# Patient Record
Sex: MALE | Race: WHITE | Employment: FULL TIME | URBAN - METROPOLITAN AREA
[De-identification: names, ages, dates, MRNs, and addresses within clinical notes are randomized per-mention and may not be internally consistent; named-entity substitution may affect disease eponyms.]

---

## 2017-06-10 ENCOUNTER — HOSPITAL ENCOUNTER (EMERGENCY)
Facility: CLINIC | Age: 52
Discharge: HOME | End: 2017-06-10
Attending: EMERGENCY MEDICINE | Admitting: EMERGENCY MEDICINE
Payer: COMMERCIAL

## 2017-06-10 VITALS
SYSTOLIC BLOOD PRESSURE: 154 MMHG | DIASTOLIC BLOOD PRESSURE: 87 MMHG | BODY MASS INDEX: 25.77 KG/M2 | OXYGEN SATURATION: 98 % | HEIGHT: 70 IN | HEART RATE: 74 BPM | WEIGHT: 180 LBS | TEMPERATURE: 98.2 F | RESPIRATION RATE: 16 BRPM

## 2017-06-10 DIAGNOSIS — S01.01XA LACERATION OF SCALP, INITIAL ENCOUNTER: ICD-10-CM

## 2017-06-10 PROCEDURE — 99282 EMERGENCY DEPT VISIT SF MDM: CPT

## 2017-06-10 NOTE — ED AVS SNAPSHOT
Emergency Department    [address] [place] [address]    Phone:  [phone]    Fax:  [phone]                                       Darius Julian   MRN: [id number]    Department:   Emergency Department   Date of Visit:  6/10/2017           After Visit Summary Signature Page     I have received my discharge instructions, and my questions have been answered. I have discussed any challenges I see with this plan with the nurse or doctor.    ..........................................................................................................................................  Patient/Patient Representative Signature      ..........................................................................................................................................  Patient Representative Print Name and Relationship to Patient    ..................................................               ................................................  Date                                            Time    ..........................................................................................................................................  Reviewed by Signature/Title    ...................................................              ..............................................  Date                                                            Time

## 2017-06-10 NOTE — ED PROVIDER NOTES
ED H&P has been dictated by Nathanael Mathur MD.  Job # [id number].           Nathanael Mathur MD  06/10/17 6691

## 2017-06-10 NOTE — ED AVS SNAPSHOT
Emergency Department    [address] [facility] [address]    Phone:  [phone]    Fax:  [phone]                                       Darius Julian   MRN: [id number]    Department:   Emergency Department   Date of Visit:  6/10/2017           Patient Information     Date Of Birth          1965        Your diagnoses for this visit were:     Laceration of scalp, initial encounter        You were seen by Nathanael Mathur MD.      Follow-up Information     Schedule an appointment as soon as possible for a visit with Matias Orantes    Specialty:  Internal Medicine    Why:  As needed if concerns regarding wound healing    Contact information:    [facility]  [address] [facility] [address] [phone]          Follow up with  Emergency Department.    Specialty:  EMERGENCY MEDICINE    Why:  As needed, If symptoms worsen    Contact information:    [address] [facility] [address] [phone]      Discharge References/Attachments     LACERATION, FACE: SUTURE OR TAPE (ENGLISH)      24 Hour Appointment Hotline       To make an appointment at any [facility], call [phone] ([phone]). If you don't have a family doctor or clinic, we will help you find one. [facility] clinics are conveniently located to serve the needs of you and your family.             Review of your medicines      Our records show that you are taking the medicines listed below. If these are incorrect, please call your family doctor or clinic.        Dose / Directions Last dose taken    SYNTHROID PO        Refills:  0                Orders Needing Specimen Collection     None      Pending Results     No orders found from 6/8/2017 to 6/11/2017.            Pending Culture Results     No orders found from 6/8/2017 to 6/11/2017.            Pending Results Instructions     If you had any lab results that were not finalized at the time of your Discharge, you can call the ED Lab  Result RN at [phone]. You will be contacted by this team for any positive Lab results or changes in treatment. The nurses are available 7 days a week from 10A to 6:30P.  You can leave a message 24 hours per day and they will return your call.        Test Results From Your Hospital Stay               Clinical Quality Measure: Blood Pressure Screening     Your blood pressure was checked while you were in the emergency department today. The last reading we obtained was  BP: 154/87 . Please read the guidelines below about what these numbers mean and what you should do about them.  If your systolic blood pressure (the top number) is less than 120 and your diastolic blood pressure (the bottom number) is less than 80, then your blood pressure is normal. There is nothing more that you need to do about it.  If your systolic blood pressure (the top number) is 120-139 or your diastolic blood pressure (the bottom number) is 80-89, your blood pressure may be higher than it should be. You should have your blood pressure rechecked within a year by a primary care provider.  If your systolic blood pressure (the top number) is 140 or greater or your diastolic blood pressure (the bottom number) is 90 or greater, you may have high blood pressure. High blood pressure is treatable, but if left untreated over time it can put you at risk for heart attack, stroke, or kidney failure. You should have your blood pressure rechecked by a primary care provider within the next 4 weeks.  If your provider in the emergency department today gave you specific instructions to follow-up with your doctor or provider even sooner than that, you should follow that instruction and not wait for up to 4 weeks for your follow-up visit.        Thank you for choosing [facility]       Thank you for choosing [facility] for your care. Our goal is always to provide you with excellent care. Hearing back from our patients is one way we can continue to improve our  "services. Please take a few minutes to complete the written survey that you may receive in the mail after you visit with us. Thank you!        [employer] Information     [email or web address] lets you send messages to your doctor, view your test results, renew your prescriptions, schedule appointments and more. To sign up, go to [email or web address] . Click on \"Log in\" on the left side of the screen, which will take you to the Welcome page. Then click on \"Sign up Now\" on the right side of the page.     You will be asked to enter the access code listed below, as well as some personal information. Please follow the directions to create your username and password.     Your access code is: [email or web address]  Expires: 2017  3:51 PM     Your access code will  in 90 days. If you need help or a new code, please call your [facility] clinic or [phone].        Care EveryWhere ID     This is your Care EveryWhere ID. This could be used by other organizations to access your [facility] medical records  [id number]        After Visit Summary       This is your record. Keep this with you and show to your community pharmacist(s) and doctor(s) at your next visit.                  "

## 2017-06-11 NOTE — ED PROVIDER NOTES
"CHIEF COMPLAINT:  \"I cut my head.\"      HISTORY OF PRESENT ILLNESS:  Darius Estrada is a generally healthy 51-year-old male who accidentally cut the top of his scalp on a shed when  he was standing up just prior to arrival.  It bled for a bit but stopped prior to arrival.  He is not on blood thinners.  He denies a headache and did not lose consciousness.  According to electronic chart records his last tetanus was in 2012.  No other injuries.  He does not feel confused.      PAST MEDICAL HISTORY:  Hypothyroidism.      MEDICATIONS:  Synthroid.      ALLERGIES:  No known drug allergies.      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  His son is an accomplished .      REVIEW OF SYSTEMS:  All other systems are reviewed and negative except as above in History of Present Illness.      PHYSICAL EXAMINATION:   VITAL SIGNS:  Temperature 36.8, blood pressure 154/87, pulse 74, respirations 16, oxygen saturation 98% on room air.   GENERAL:  Nontoxic appearing male standing in room 4.   SKIN:  Approximately 4 cm linear superficial laceration to the left side of his scalp which has no hair in the surrounding area.  There is no active bleeding.  There is no gaping.  There is no surrounding ecchymosis.   NEUROLOGIC:  Awake, alert, clear speech.  Normal strength and sensation in extremities and ambulatory without ataxia.   MUSCULOSKELETAL:  No bony tenderness to the scalp.   PSYCHIATRIC:  Calm, cooperative.      ED COURSE:  I took a history and physical exam of Mr. Estrada in room #4.  I had performed electronic chart review.  We discussed wound care options and he agreed to defer any sutures for this very superficial laceration.  He had his wound cleansed and dressed with Steri-Strips by the ERT.  Bacitracin was applied.  Supportive measures were discussed and specific return precautions were reviewed with the patient when I rechecked him at 1533 hours.  He was " content with this plan, he was discharged home.      MDM:  This laceration is so superficial and is not actively bleeding or gaping and I did not feel that sutures would provide a clinical benefit and may actually place him at high risk of infection and other wound healing complications.  He fully agrees with this.  I took a picture of his wound on his own phone and then showed it to him to help him get a sense of the nature of his injury.  I have an extremely low suspicion for a bony fracture or intracranial process and no emergent testing is indicated.      DIAGNOSIS:  Simple left scalp laceration.         SUGAR DEE MD             D: 06/10/2017 17:47   T: 06/10/2017 18:24   MT: GARRET#[id number]      Name:     ITZEL LEDESMA   MRN:      [id number]        Account:      [id number]   :      1965           Visit Date:   06/10/2017      Document: [id number]

## 2019-12-20 ENCOUNTER — HOSPITAL PATHOLOGY (OUTPATIENT)
Dept: OTHER | Facility: CLINIC | Age: 54
End: 2019-12-20

## 2019-12-24 LAB — COPATH REPORT: NORMAL

## 2020-08-06 ENCOUNTER — HOSPITAL ENCOUNTER (OUTPATIENT)
Facility: CLINIC | Age: 55
End: 2020-08-06
Attending: COLON & RECTAL SURGERY | Admitting: COLON & RECTAL SURGERY
Payer: COMMERCIAL

## 2020-08-07 DIAGNOSIS — Z11.59 ENCOUNTER FOR SCREENING FOR OTHER VIRAL DISEASES: Primary | ICD-10-CM

## 2020-10-04 RX ORDER — LIDOCAINE 40 MG/G
CREAM TOPICAL
Status: CANCELLED | OUTPATIENT
Start: 2020-10-04

## 2020-10-04 RX ORDER — ONDANSETRON 2 MG/ML
4 INJECTION INTRAMUSCULAR; INTRAVENOUS
Status: CANCELLED | OUTPATIENT
Start: 2020-10-04

## 2020-12-14 DIAGNOSIS — Z00.00 PREVENTATIVE HEALTH CARE: Primary | ICD-10-CM

## 2020-12-14 DIAGNOSIS — E03.9 HYPOTHYROIDISM, UNSPECIFIED TYPE: ICD-10-CM

## 2020-12-17 ENCOUNTER — OFFICE VISIT (OUTPATIENT)
Dept: CARDIOLOGY | Facility: CLINIC | Age: 55
End: 2020-12-17
Payer: COMMERCIAL

## 2020-12-17 VITALS
HEART RATE: 62 BPM | HEIGHT: 70 IN | SYSTOLIC BLOOD PRESSURE: 136 MMHG | BODY MASS INDEX: 24.02 KG/M2 | WEIGHT: 167.8 LBS | DIASTOLIC BLOOD PRESSURE: 95 MMHG | OXYGEN SATURATION: 97 %

## 2020-12-17 DIAGNOSIS — Z00.00 PREVENTATIVE HEALTH CARE: ICD-10-CM

## 2020-12-17 DIAGNOSIS — M51.26 LUMBAR HERNIATED DISC: ICD-10-CM

## 2020-12-17 DIAGNOSIS — M51.369 DDD (DEGENERATIVE DISC DISEASE), LUMBAR: ICD-10-CM

## 2020-12-17 DIAGNOSIS — E78.5 HYPERLIPIDEMIA LDL GOAL <160: ICD-10-CM

## 2020-12-17 DIAGNOSIS — E03.9 HYPOTHYROIDISM, UNSPECIFIED TYPE: ICD-10-CM

## 2020-12-17 DIAGNOSIS — Z86.0100 HISTORY OF COLONIC POLYPS: ICD-10-CM

## 2020-12-17 LAB
CHOLEST SERPL-MCNC: 228 MG/DL
CREAT UR-MCNC: 280 MG/DL
CRP SERPL HS-MCNC: 0.4 MG/L
GLUCOSE SERPL-MCNC: 98 MG/DL (ref 70–99)
HDLC SERPL-MCNC: 79 MG/DL
LDLC SERPL CALC-MCNC: 123 MG/DL
MICROALBUMIN UR-MCNC: 22 MG/L
MICROALBUMIN/CREAT UR: 7.96 MG/G CR (ref 0–17)
NONHDLC SERPL-MCNC: 149 MG/DL
NT-PROBNP SERPL-MCNC: 34 PG/ML (ref 0–125)
TRIGL SERPL-MCNC: 131 MG/DL

## 2020-12-17 PROCEDURE — 99000 SPECIMEN HANDLING OFFICE-LAB: CPT | Performed by: PATHOLOGY

## 2020-12-17 PROCEDURE — 80061 LIPID PANEL: CPT | Performed by: PATHOLOGY

## 2020-12-17 PROCEDURE — 83880 ASSAY OF NATRIURETIC PEPTIDE: CPT | Performed by: PATHOLOGY

## 2020-12-17 PROCEDURE — 82043 UR ALBUMIN QUANTITATIVE: CPT | Performed by: PATHOLOGY

## 2020-12-17 PROCEDURE — 36415 COLL VENOUS BLD VENIPUNCTURE: CPT | Performed by: PATHOLOGY

## 2020-12-17 PROCEDURE — 99205 OFFICE O/P NEW HI 60 MIN: CPT | Mod: 25 | Performed by: NURSE PRACTITIONER

## 2020-12-17 PROCEDURE — 93922 UPR/L XTREMITY ART 2 LEVELS: CPT | Performed by: NURSE PRACTITIONER

## 2020-12-17 PROCEDURE — 86141 C-REACTIVE PROTEIN HS: CPT | Mod: 90 | Performed by: PATHOLOGY

## 2020-12-17 PROCEDURE — 82947 ASSAY GLUCOSE BLOOD QUANT: CPT | Performed by: PATHOLOGY

## 2020-12-17 ASSESSMENT — MIFFLIN-ST. JEOR: SCORE: 1597.39

## 2020-12-17 NOTE — LETTER
12/17/2020      RE: Darius Julian  [address] [place] [address]       Dear Colleague,    Thank you for the opportunity to participate in the care of your patient, Darius Julian, at the [facility] [place] [facility]. Please see a copy of my visit note below.    [facility] for Cardiovascular Disease Prevention - Exam Note    Active Problems   Patient Active Problem List    Diagnosis Date Noted     DDD (degenerative disc disease), lumbar      Priority: Medium     Lumbar herniated disc      Priority: Medium     L4-5       Hyperlipidemia LDL goal <160      Priority: Medium     History of colonic polyps      Priority: Medium       Reason For Visit   Patient here for [facility] early detection of atherosclerosis and CVD exam.    Pain Evaluation  Current history of pain associated with this visit is: denied    HPI   Darius Julian is a 55 year old year old male with a history of hyperlipidemia, intolerant to atorvastatin due to constipation. hypothyroidism, GERD, and herniated disc L4-5..  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  He is referred to our clinic by Dr. Matias Fisher MD.  He has been through the [facility] program 3 times during [id number].    Nutrition assessment per patient report:   Foods with fat/cholesterol (fried foods, fatty meats, junk food):  0 servings   Fruits and vegetables (  cup cooked, 1 cup raw):  1 serving vegetables/day, fruit 1 serving/day  Caffeine (1 cup coffee, soda, etc):  2-3 cups of coffee/day  Alcohol servings (12 oz. beer, 4 oz. wine, 1  oz. in mixed drink):  6 glasses of wine/week, more during COVID  Calcium servings (dairy foods, 8 oz. milk, yogurt, cheese, ice cream):  yogurt and cheese  Salt/sodium use:  no  Special dietary habits:  lactose intolerant    Activity  Patient is active 2 times per week for 20-30 minutes, 1 1/2 to 2 mile walk or  more minutes with walking.    Laboratory Results Review  We discussed laboratory results today including lipids targets and how foods influence cholesterol.    Weight  His perceived healthy weight is    pounds.  A normal BMI of 25 is equal to 174 pounds.  The current BMI of 24.08 is normal weight range.  A weight reduction speed of 2-3 lbs per month for men is recommended.    PMH   Past Medical History:   Diagnosis Date     DDD (degenerative disc disease), lumbar      History of colonic polyps      Hyperlipidemia LDL goal <160      Hypothyroidism      Thyroid disease        PSH  Past Surgical History:   Procedure Laterality Date     HC TOOTH EXTRACTION W/FORCEP         Current Meds   Current Outpatient Medications   Medication Sig Dispense Refill     Levothyroxine Sodium (SYNTHROID PO)        Allergies      Allergies   Allergen Reactions     Lactose        Family Hx   Family History   Problem Relation Age of Onset     Hypertension Mother      Cerebrovascular Disease Mother      Colon Cancer Father      Osteogenesis imperfecta Sister      Coronary Artery Disease Brother         MI age 51, PCI/stent 2013 1 stent     Hypertension Brother      Hyperlipidemia Brother      Coronary Artery Disease Maternal Grandmother         MI age 60     Attention Deficit Disorder Son      Attention Deficit Disorder Son        Social History  Darius is [employer] and is working full time. His job is [employer].  He is  with 3 children, 2 sons and 1 daughter.     Enjoyment of life is 8 with 10 enjoys life fully.    Tobacco History  History   Smoking Status     Never Smoker   Smokeless Tobacco     Never Used       ROS  CONSTITUTIONAL:  No fever, chills, or sweats. No weight gain/loss.   EENT:  No visual disturbance, ear ache, epistaxis, sore throat  ALLERGIES/IMMUNOLOGIC:  Negative  RESPIRATORY:  No cough, hemoptysis  CARDIOVASCULAR:  As per HPI  GI:  No nausea, vomiting, hematemesis, melena  :  No urinary frequency,  "dysuria, or hematuria  INTEGUMENT:  Negative  PSYCHIATRIC:  Negative  NEURO:  Negative  ENDOCRINE:  Negative  MUSCULOSKELETAL:  Negative     Vital Signs   BP (!) 136/95 (BP Location: Left arm, Patient Position: Sitting, Cuff Size: Adult Regular)   Pulse 62   Ht 1.778 m (5' 10\")   Wt 76.1 kg (167 lb 12.8 oz)   SpO2 97%   BMI 24.08 kg/m        Waist: 33.5 inches  Hip: 38.5 inches    Physical Exam   In general, the patient is a pleasant male in no apparent distress.    HEENT: NC/AT.  PERRLA.  EOMI.  Sclerae white, not injected.  Nares clear.  Pharynx without erythema or exudate.  Dentition intact.    Neck: No adenopathy.  No thyromegaly.Carotids +4/4 bilaterally without bruits.  No jugular venous distension.   Lungs: Breath sounds clear celia without crackles, ronchi, wheezes  Cor: RRR. S1S2 without murmur, rub, click, or gallop. The PMI is in the 5th ICS in the midclavicular line.   Abdomen: Soft, nontender, nondistended, BS + present in all 4 quadrants, no hepatomegaly, no aorta or renal artery bruits.   Extremities: No clubbing, cyanosis, or edema. DP and PT pulses are +2/2 at the post-tibial sites bilaterally.     Recent Labs  Lab Results   Component Value Date    GLC 98 12/17/2020      Lab Results   Component Value Date    NTBNP 34 12/17/2020     No results found for: NTBNPI   Lab Results   Component Value Date    UCRR 280 12/17/2020      Lab Results   Component Value Date    MICROL 22 12/17/2020      No results found for: MICROALBUMIN   Lab Results   Component Value Date    CRP 0.4 12/17/2020      Lab Results   Component Value Date    CHOL 228 (H) 12/17/2020      Lab Results   Component Value Date    TRIG 131 12/17/2020      Lab Results   Component Value Date    HDL 79 12/17/2020      Lab Results   Component Value Date     (H) 12/17/2020      No results found for: VLDL   No results found for: CHOLHDLRATIO  Lab Results   Component Value Date    NHDL 149 (H) 12/17/2020        Executive Health Visit at " [facility]     2005- negative exercise stress test  1/18/2012 negative exercise stress test, coronary calcium score 0  8/5/2015 negative exercise stress test    [facility] Test Results    WALKING BLOOD PRESSURE RESPONSE (3 minute, 5 MET level walk)   Pre BP:140/82 mmHg  3 min BP: 160/60 mmHg  1 min post BP: 120/80 mmHg    Pre HR: 77 bpm  3 min HR: 90 bpm  1 min post HR: 80 bpm       ABDOMINAL AORTA ULTRASOUND (< 2.5 normal, borderline 2.5-2.9, abnormal > 3)   SupraIliac 1.70 cm    SupraRenal 1.80 cm    InfraRenal Proximal 1.80 cm    InfraRenal Distal 2.2 cm      Abdominal Aorta Assessment:  normal      LEFT VENTRICULAR ULTRASOUND MEASUREMENTS (adjusted for BSA)  LVIDD 54.9 mm   Septa 9.3 mm   Posterior 9.8 mm     Left Ventricular US Assessment:  normal    Carotid Artery IMT measurements report and plaques in the small area examined:   Left IMT 0.618 mm  Plaques none    Right IMT 0.759 mm  Plaques none       ECG (see tracing):  normal sinus rhythm with incomplete RBBB;  rate: 62 bpm    Arterial Elasticity per age and gender (see printout):   C1 10 mL/mmHg x 10  borderline   C2 4.0 mL/mmHg x 100 borderline   Supine blood pressure: 165/96 mmHg     Assessment:     Cardiovascular:  Asymptomatic, currently not complaining of chest pain, plan to schedule to coronary artery calcium score, last CAC was in 2012    Blood Pressure:  BP elevated in clinic, rising over the past 3-4 months, not on BP medication.  Recommend starting Lotrel 2.5/10 mg every day. Recheck BMP 10-14 days after starting Lotrel.  Darius plans to have this blood draw at Dr. Orantes's office. Continue to monitor BP, record and bring results to f/u visit with Dr. Orantes.      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    LIPIDS  Component Name  8/4/2020 8/8/2019 7/5/2018 5/9/2017 12/22/2015 8/15/2014 8/6/2013 12/5/2012 3/26/2012     247 (H) 277 (H) 231 (H) 228 (H) 165 272 (H) 213 (H) 228 (H) 217 (H)   155 (H) 167 (H) 132 102 158 (H) 164 (H) 143 152 (H) 107   62 60 54 52 58 63 58 59 53   3.98 4.62 (H) 4.28 4.38 2.84 4.32 3.67 3.86 4.09   154 (H) 184 (H) 151 (H) 156 (H) 75 176 (H) 126 139 (H) 143 (H)         FASTING NOT GIVEN NOT GIVEN Fasting Fasting Fasting   185 (H) 217 (H) 177 (H) 176 (H) 107 209 (H) 155 169     RANDOM RANDOM RANDOM               Recommend restarting atorvastatin 20 mg X 1 week then advance to 40 mg every day.  Recheck fasting lipid profile and alt 12 weeks after starting atorvastatin at Dr. Orantes's office. Increase intake of water to 64 ounces/day as he start this medication as you have had issues with constipation in the past.     Glucose: 98    Return to clinic:  2 years  Health Habit Summary:  Nutrition: Heart Healthy Eating:  most of the time   Exercise:  Exercises 2x/week, used to play hockey  Weight:  normal weight range  Tobacco Use:  never used    Full report to follow prevention team review of test results with scanned final report.    Time spent for patient visit was 60 minutes with more than half the time spent on counseling and coordination of care.    JOHN Goode CNP       CC  Patient Care Team:  Matias Orantes as PCP - General (Internal Medicine)  SELF, REFERRED    Please do not hesitate to contact me if you have any questions/concerns.     Sincerely,     JOHN Goode CNP

## 2020-12-17 NOTE — PROGRESS NOTES
[facility] for Cardiovascular Disease Prevention - Exam Note    Active Problems   Patient Active Problem List    Diagnosis Date Noted     DDD (degenerative disc disease), lumbar      Priority: Medium     Lumbar herniated disc      Priority: Medium     L4-5       Hyperlipidemia LDL goal <160      Priority: Medium     History of colonic polyps      Priority: Medium       Reason For Visit   Patient here for [facility] early detection of atherosclerosis and CVD exam.    Pain Evaluation  Current history of pain associated with this visit is: denied    HPI   Dariustra Julian is a 55 year old year old male with a history of hyperlipidemia, intolerant to atorvastatin due to constipation. hypothyroidism, GERD, and herniated disc L4-5..  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  He is referred to our clinic by Dr. Matias Fisher MD.  He has been through the [employer] [facility] program 3 times during [id number].    Nutrition assessment per patient report:   Foods with fat/cholesterol (fried foods, fatty meats, junk food):  0 servings   Fruits and vegetables (  cup cooked, 1 cup raw):  1 serving vegetables/day, fruit 1 serving/day  Caffeine (1 cup coffee, soda, etc):  2-3 cups of coffee/day  Alcohol servings (12 oz. beer, 4 oz. wine, 1  oz. in mixed drink):  6 glasses of wine/week, more during COVID  Calcium servings (dairy foods, 8 oz. milk, yogurt, cheese, ice cream):  yogurt and cheese  Salt/sodium use:  no  Special dietary habits:  lactose intolerant    Activity  Patient is active 2 times per week for 20-30 minutes, 1 1/2 to 2 mile walk or more minutes with walking.    Laboratory Results Review  We discussed laboratory results today including lipids targets and how foods influence cholesterol.    Weight  His perceived healthy weight is    pounds.  A normal BMI of 25 is equal to 174 pounds.  The current BMI of 24.08 is normal weight range.  A weight reduction speed of 2-3 lbs per month for men is recommended.    PM  "  Past Medical History:   Diagnosis Date     DDD (degenerative disc disease), lumbar      History of colonic polyps      Hyperlipidemia LDL goal <160      Hypothyroidism      Thyroid disease        PSH  Past Surgical History:   Procedure Laterality Date     HC TOOTH EXTRACTION W/FORCEP         Current Meds   Current Outpatient Medications   Medication Sig Dispense Refill     Levothyroxine Sodium (SYNTHROID PO)          Allergies      Allergies   Allergen Reactions     Lactose        Family Hx   Family History   Problem Relation Age of Onset     Hypertension Mother      Cerebrovascular Disease Mother      Colon Cancer Father      Osteogenesis imperfecta Sister      Coronary Artery Disease Brother         MI age 51, PCI/stent 2013 1 stent     Hypertension Brother      Hyperlipidemia Brother      Coronary Artery Disease Maternal Grandmother         MI age 60     Attention Deficit Disorder Son      Attention Deficit Disorder Son        Social History  Darius is [employer] strategy team and is working full time. His job is [employer].  He is  with 3 children, 2 sons and 1 daughter.     Enjoyment of life is 8 with 10 enjoys life fully.    Tobacco History  History   Smoking Status     Never Smoker   Smokeless Tobacco     Never Used       ROS  CONSTITUTIONAL:  No fever, chills, or sweats. No weight gain/loss.   EENT:  No visual disturbance, ear ache, epistaxis, sore throat  ALLERGIES/IMMUNOLOGIC:  Negative  RESPIRATORY:  No cough, hemoptysis  CARDIOVASCULAR:  As per HPI  GI:  No nausea, vomiting, hematemesis, melena  :  No urinary frequency, dysuria, or hematuria  INTEGUMENT:  Negative  PSYCHIATRIC:  Negative  NEURO:  Negative  ENDOCRINE:  Negative  MUSCULOSKELETAL:  Negative     Vital Signs   BP (!) 136/95 (BP Location: Left arm, Patient Position: Sitting, Cuff Size: Adult Regular)   Pulse 62   Ht 1.778 m (5' 10\")   Wt 76.1 kg (167 lb 12.8 oz)   SpO2 97%   BMI 24.08 kg/m        Waist: 33.5 inches  Hip: 38.5 " inches    Physical Exam   In general, the patient is a pleasant male in no apparent distress.    HEENT: NC/AT.  PERRLA.  EOMI.  Sclerae white, not injected.  Nares clear.  Pharynx without erythema or exudate.  Dentition intact.    Neck: No adenopathy.  No thyromegaly.Carotids +4/4 bilaterally without bruits.  No jugular venous distension.   Lungs: Breath sounds clear celia without crackles, ronchi, wheezes  Cor: RRR. S1S2 without murmur, rub, click, or gallop. The PMI is in the 5th ICS in the midclavicular line.   Abdomen: Soft, nontender, nondistended, BS + present in all 4 quadrants, no hepatomegaly, no aorta or renal artery bruits.   Extremities: No clubbing, cyanosis, or edema. DP and PT pulses are +2/2 at the post-tibial sites bilaterally.     Recent Labs  Lab Results   Component Value Date    GLC 98 12/17/2020      Lab Results   Component Value Date    NTBNP 34 12/17/2020     No results found for: NTBNPI   Lab Results   Component Value Date    UCRR 280 12/17/2020      Lab Results   Component Value Date    MICROL 22 12/17/2020      No results found for: MICROALBUMIN   Lab Results   Component Value Date    CRP 0.4 12/17/2020      Lab Results   Component Value Date    CHOL 228 (H) 12/17/2020      Lab Results   Component Value Date    TRIG 131 12/17/2020      Lab Results   Component Value Date    HDL 79 12/17/2020      Lab Results   Component Value Date     (H) 12/17/2020      No results found for: VLDL   No results found for: CHOLHDLRATIO  Lab Results   Component Value Date    NHDL 149 (H) 12/17/2020        [facility] Visit at [place]     2005- negative exercise stress test  1/18/2012 negative exercise stress test, coronary calcium score 0  8/5/2015 negative exercise stress test    Sanderson Test Results    WALKING BLOOD PRESSURE RESPONSE (3 minute, 5 MET level walk)   Pre BP:140/82 mmHg  3 min BP: 160/60 mmHg  1 min post BP: 120/80 mmHg    Pre HR: 77 bpm  3 min HR: 90 bpm  1 min post HR: 80 bpm        ABDOMINAL AORTA ULTRASOUND (< 2.5 normal, borderline 2.5-2.9, abnormal > 3)   SupraIliac 1.70 cm    SupraRenal 1.80 cm    InfraRenal Proximal 1.80 cm    InfraRenal Distal 2.2 cm      Abdominal Aorta Assessment:  normal      LEFT VENTRICULAR ULTRASOUND MEASUREMENTS (adjusted for BSA)  LVIDD 54.9 mm   Septa 9.3 mm   Posterior 9.8 mm     Left Ventricular US Assessment:  normal      Carotid Artery IMT measurements report and plaques in the small area examined:   Left IMT 0.618 mm  Plaques none    Right IMT 0.759 mm  Plaques none       ECG (see tracing):  normal sinus rhythm with incomplete RBBB;  rate: 62 bpm      Arterial Elasticity per age and gender (see printout):   C1 10 mL/mmHg x 10  borderline   C2 4.0 mL/mmHg x 100 borderline   Supine blood pressure: 165/96 mmHg       Assessment:     Cardiovascular:  Asymptomatic, currently not complaining of chest pain, plan to schedule to coronary artery calcium score, last CAC was in 2012    Blood Pressure:  BP elevated in clinic, rising over the past 3-4 months, not on BP medication.  Recommend starting Lotrel 2.5/10 mg every day. Recheck BMP 10-14 days after starting Lotrel.  Darius plans to have this blood draw at Dr. Orantes's office. Continue to monitor BP, record and bring results to f/u visit with Dr. Orantes.      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    LIPIDS  Component Name  8/4/2020 8/8/2019 7/5/2018 5/9/2017 12/22/2015 8/15/2014 8/6/2013 12/5/2012 3/26/2012     247 (H) 277 (H) 231 (H) 228 (H) 165 272 (H) 213 (H) 228 (H) 217 (H)   155 (H) 167 (H) 132 102 158 (H) 164 (H)  143 152 (H) 107   62 60 54 52 58 63 58 59 53   3.98 4.62 (H) 4.28 4.38 2.84 4.32 3.67 3.86 4.09   154 (H) 184 (H) 151 (H) 156 (H) 75 176 (H) 126 139 (H) 143 (H)         FASTING NOT GIVEN NOT GIVEN Fasting Fasting Fasting   185 (H) 217 (H) 177 (H) 176 (H) 107 209 (H) 155 169     RANDOM RANDOM RANDOM               Recommend restarting atorvastatin 20 mg X 1 week then advance to 40 mg every day.  Recheck fasting lipid profile and alt 12 weeks after starting atorvastatin at Dr. Orantes's office. Increase intake of water to 64 ounces/day as he start this medication as you have had issues with constipation in the past.     Glucose: 98    Return to clinic:  2 years  Health Habit Summary:  Nutrition: Heart Healthy Eating:  most of the time   Exercise:  Exercises 2x/week, used to play hockey  Weight:  normal weight range  Tobacco Use:  never used    Full report to follow prevention team review of test results with scanned final report.    Time spent for patient visit was 60 minutes with more than half the time spent on counseling and coordination of care.    JOHN Goode CNP       CC  Patient Care Team:  Matias Orantes as PCP - General (Internal Medicine)  SELF, REFERRED

## 2020-12-22 LAB — INTERPRETATION ECG - MUSE: NORMAL

## 2020-12-31 RX ORDER — ATORVASTATIN CALCIUM 40 MG/1
40 TABLET, FILM COATED ORAL DAILY
Qty: 90 TABLET | Refills: 0 | Status: SHIPPED | OUTPATIENT
Start: 2020-12-31

## 2020-12-31 RX ORDER — AMLODIPINE BESYLATE AND BENAZEPRIL HYDROCHLORIDE 2.5; 1 MG/1; MG/1
1 CAPSULE ORAL DAILY
Qty: 90 CAPSULE | Refills: 3 | Status: SHIPPED | OUTPATIENT
Start: 2020-12-31

## 2021-03-30 DIAGNOSIS — E78.5 HYPERLIPIDEMIA LDL GOAL <160: ICD-10-CM

## 2021-03-31 NOTE — TELEPHONE ENCOUNTER
" atorvastatin (LIPITOR) 40 MG tablet  Take 1 tablet (40 mg) by mouth daily Take 1/2 pill X 7 days and then advance to 1 pill every day   Last Written Prescription Date:  12/31/20  Last Fill Quantity: 90,   # refills: 0  Last Office Visit : 12/17/20 [facility] Cardiovascular Disease [facility] Office visit:  None> recommended 2 years    Routing refill request to provider for review/approval because:  Kin   Per 12/17/20 note:\" Recheck fasting lipid profile and alt 12 weeks after starting atorvastatin at Dr. Orantes's office.\"  No outside labs noted in Care everywhere/ media tab for recheck      "

## 2021-04-08 RX ORDER — ATORVASTATIN CALCIUM 40 MG/1
40 TABLET, FILM COATED ORAL DAILY
Qty: 90 TABLET | Refills: 0 | OUTPATIENT
Start: 2021-04-08

## 2023-11-02 DIAGNOSIS — E78.5 HYPERLIPIDEMIA LDL GOAL <160: Primary | ICD-10-CM

## 2023-11-09 ENCOUNTER — OFFICE VISIT (OUTPATIENT)
Dept: CARDIOLOGY | Facility: CLINIC | Age: 58
End: 2023-11-09
Payer: COMMERCIAL

## 2023-11-09 ENCOUNTER — LAB (OUTPATIENT)
Dept: LAB | Facility: CLINIC | Age: 58
End: 2023-11-09
Payer: COMMERCIAL

## 2023-11-09 VITALS
OXYGEN SATURATION: 98 % | WEIGHT: 175.7 LBS | DIASTOLIC BLOOD PRESSURE: 76 MMHG | SYSTOLIC BLOOD PRESSURE: 130 MMHG | HEIGHT: 69 IN | BODY MASS INDEX: 26.02 KG/M2 | HEART RATE: 72 BPM

## 2023-11-09 DIAGNOSIS — I10 BENIGN ESSENTIAL HYPERTENSION: ICD-10-CM

## 2023-11-09 DIAGNOSIS — E78.5 HYPERLIPIDEMIA LDL GOAL <160: ICD-10-CM

## 2023-11-09 DIAGNOSIS — E78.5 HYPERLIPIDEMIA LDL GOAL <160: Primary | ICD-10-CM

## 2023-11-09 LAB
APO A-I SERPL-MCNC: <6 MG/DL
CHOLEST SERPL-MCNC: 151 MG/DL
CREAT UR-MCNC: 148 MG/DL
CRP SERPL HS-MCNC: 4.28 MG/L
FASTING STATUS PATIENT QL REPORTED: NORMAL
GLUCOSE SERPL-MCNC: 90 MG/DL (ref 70–99)
HDLC SERPL-MCNC: 65 MG/DL
LDLC SERPL CALC-MCNC: 63 MG/DL
MICROALBUMIN UR-MCNC: <12 MG/L
MICROALBUMIN/CREAT UR: NORMAL MG/G{CREAT}
NONHDLC SERPL-MCNC: 86 MG/DL
TRIGL SERPL-MCNC: 113 MG/DL

## 2023-11-09 PROCEDURE — 82570 ASSAY OF URINE CREATININE: CPT | Performed by: NURSE PRACTITIONER

## 2023-11-09 PROCEDURE — 93922 UPR/L XTREMITY ART 2 LEVELS: CPT | Performed by: NURSE PRACTITIONER

## 2023-11-09 PROCEDURE — 82947 ASSAY GLUCOSE BLOOD QUANT: CPT | Performed by: PATHOLOGY

## 2023-11-09 PROCEDURE — 36415 COLL VENOUS BLD VENIPUNCTURE: CPT | Performed by: PATHOLOGY

## 2023-11-09 PROCEDURE — 83695 ASSAY OF LIPOPROTEIN(A): CPT | Performed by: NURSE PRACTITIONER

## 2023-11-09 PROCEDURE — 86141 C-REACTIVE PROTEIN HS: CPT | Performed by: NURSE PRACTITIONER

## 2023-11-09 PROCEDURE — 80061 LIPID PANEL: CPT | Performed by: PATHOLOGY

## 2023-11-09 PROCEDURE — 99000 SPECIMEN HANDLING OFFICE-LAB: CPT | Performed by: PATHOLOGY

## 2023-11-09 PROCEDURE — 99215 OFFICE O/P EST HI 40 MIN: CPT | Mod: 25 | Performed by: NURSE PRACTITIONER

## 2023-11-09 RX ORDER — ATORVASTATIN CALCIUM 40 MG/1
40 TABLET, FILM COATED ORAL DAILY
Qty: 90 TABLET | Refills: 3 | Status: SHIPPED | OUTPATIENT
Start: 2023-11-09

## 2023-11-09 RX ORDER — AMLODIPINE AND BENAZEPRIL HYDROCHLORIDE 5; 20 MG/1; MG/1
1 CAPSULE ORAL DAILY
Qty: 90 CAPSULE | Refills: 3 | Status: SHIPPED | OUTPATIENT
Start: 2023-11-09 | End: 2024-07-10

## 2023-11-09 RX ORDER — OMEPRAZOLE 40 MG/1
CAPSULE, DELAYED RELEASE ORAL
COMMUNITY
Start: 2022-12-29

## 2023-11-09 RX ORDER — LEVOTHYROXINE SODIUM 88 UG/1
1 TABLET ORAL DAILY
COMMUNITY
Start: 2014-01-01

## 2023-11-09 NOTE — LETTER
11/9/2023      RE: Darius Julian  [address]  [place] [address]       Dear Colleague,    Thank you for the opportunity to participate in the care of your patient, Darius Julian, at the [facility] FOR CARDIOVASCULAR DISEASE PREVENTION [facility] [facility]. Please see a copy of my visit note below.      [facility] for Cardiovascular Disease Prevention - Exam Note    Active Problems   Patient Active Problem List    Diagnosis Date Noted     DDD (degenerative disc disease), lumbar      Priority: Medium     Lumbar herniated disc      Priority: Medium     L4-5       Hyperlipidemia LDL goal <160      Priority: Medium     History of colonic polyps      Priority: Medium       Reason For Visit   Patient here for [facility] early detection of atherosclerosis and CVD exam.    Pain Evaluation  Current history of pain associated with this visit is: denied    Cardiac risk factors:  - age    - smoking     - elevated BMI      + Family history CVD       - Diet           + Hypertension    HPI   Darius Julian is a 58 year old year old male with a history of hypertension, hyperlipidemia and hypothyroidism. He takes 5/20 mg of lotrel and 40 mg of atorvastatin.  His most recent CAC score in 2021 was 26.9, 45 percentile. His family history of heart disease includes his mother having history of hypertension and TIAs, his brother had a MI at age 51 s/p PCI/LESTER and his maternal grandmother had a MI at age 60. He was seen in the [facility] CV clinic in 12./2020, score 6.  His primary care provider was Dr. Orantes.  He continues to look for a new PCP. He worked at [employer] and left last April. He was in corporate strategy leader.  Today in clinic he denies chest pain at rest, with activity, while sleeping, SOB at rest, with activity or while sleeping, palpitations, lightheadedness, lower leg edema, calf cramps, indigestion, headaches or issues  with his memory. He had an episode in September 2023 where BP went up to 180/90s mmHg.  His Lotrel dose was doubled at that time.     Nutrition assessment per patient report:   Foods with fat/cholesterol (fried foods, fatty meats, junk food):   tinajero 1x/1-2 months, salomi 2x/month     Fruits and vegetables (  cup cooked, 1 cup raw):  1-3 sevings of vegetables/day, 1-3 servings of fruit/day  Caffeine (1 cup coffee, soda, etc):   3-4 cups coffee/day  Alcohol servings (12 oz. beer, 4 oz. wine, 1  oz. in mixed drink):   10 drinks/week, wine with dinner  Special dietary habits:  healthy diet  Typical breakfast: toast, bagel, granola bar                 Lunch: sandwich, meat and cheese                 Dinner: protein, vegetable, salad                 Snacks: no                 Drinks:  water  Activity  Patient is active 5-10,000 steps/day    Sleep pattern: good    Laboratory Results Review  We discussed laboratory results today including lipids targets and how foods influence cholesterol.    Weight  His perceived healthy weight is 170-180 pounds.  A normal BMI of 25 is equal to 166 pounds.  The current BMI of 26.33 is normal weight range.      PMH   Past Medical History:   Diagnosis Date     Benign essential hypertension      History of colonic polyps      Hyperlipidemia LDL goal <160      Hypothyroidism      Lumbar herniated disc     L4-5     Thyroid disease        PSH  Past Surgical History:   Procedure Laterality Date     HC TOOTH EXTRACTION W/FORCEP         Current Meds   Current Outpatient Medications   Medication Sig Dispense Refill     levothyroxine (SYNTHROID/LEVOTHROID) 88 MCG tablet Take 1 tablet by mouth daily       omeprazole (PRILOSEC) 40 MG DR capsule PRN. Updated on 12/29/22       amLODIPine-benazepril (LOTREL) 2.5-10 MG capsule Take 1 capsule by mouth daily 90 capsule 3     atorvastatin (LIPITOR) 40 MG tablet Take 1 tablet (40 mg) by mouth daily Take 1/2 pill X 7 days and then advance to 1 pill every day  "90 tablet 0       Allergies      Allergies   Allergen Reactions     Lactose        Family Hx   Family History   Problem Relation Age of Onset     Hypertension Mother      Cerebrovascular Disease Mother         s/p TIA     Colon Cancer Father         cause of death     Osteogenesis imperfecta Sister      Coronary Artery Disease Brother 51        s/p MI age 51, PCI/stent 2013 1 stent     Hypertension Brother      Hyperlipidemia Brother      Coronary Artery Disease Maternal Grandmother         s/p MI age 60, cause of death     Attention Deficit Disorder Son      Attention Deficit Disorder Son        Social History    He is  with 3 children    Tobacco History  History   Smoking Status     Never   Smokeless Tobacco     Never       ROS  CONSTITUTIONAL:  No fever, chills, or sweats. No weight gain/loss.   EENT:  No visual disturbance, ear ache, epistaxis, sore throat  ALLERGIES/IMMUNOLOGIC:  Negative  RESPIRATORY:  No cough, hemoptysis  CARDIOVASCULAR:  As per HPI  GI:  No nausea, vomiting, hematemesis, melena  :  No urinary frequency, dysuria, or hematuria  INTEGUMENT:  Negative  PSYCHIATRIC:  Negative  NEURO:  Negative  ENDOCRINE:  Negative  MUSCULOSKELETAL:  Negative     Vital Signs   /76 (Cuff Size: Adult Regular)   Pulse 72   Ht 1.74 m (5' 8.5\")   Wt 79.7 kg (175 lb 11.2 oz)   SpO2 98%   BMI 26.33 kg/m        Waist: 37.5 inches  Hip: 39.5 inches    Physical Exam   In general, the patient is a pleasant male in no apparent distress   HEENT: NC/AT, PERRLA, EOMI, sclerae white, not injected. Nares clear, pharynx without erythema or exudate, dentition intact    Neck: No adenopathy, no thyromegaly, carotids +4/4 bilaterally without bruits,  no jugular venous distension   Lungs: Breath sounds clear bilaterally, without crackles, ronchi, or wheezes  Cor: RRR, S1S2 without murmur, rub, click, or gallop, the PMI is in the 5th ICS in the midclavicular line  Abdomen: Soft, nontender, nondistended, BS+ in all 4 " "quadrants, without hepatomegaly, no aorta or renal artery bruits  Extremities: No clubbing, cyanosis, or edema. DP and PT pulses +2/4 bilaterally    The 10-year ASCVD risk score (Hang SEPULVEDA Jr., et al., 2013) is: ENGLISH score 7.1%  Values used to calculate the score:   Age: 58 year old   Sex: male   Is Non- : No   Diabetic: No   Tobacco smoker: No   Systolic Blood Press: 132 mmHg   Is BP treated: No   HDL Cholesterol: 65 mg/dL   Total Cholesterol: 151 mg/dL    Recent Labs  Lab Results   Component Value Date    GLC 90 11/09/2023    GLC 98 12/17/2020      Lab Results   Component Value Date    NTBNP 34 12/17/2020     No results found for: \"NTBNPI\"   Lab Results   Component Value Date    UCRR 280 12/17/2020      Lab Results   Component Value Date    MICROL 22 12/17/2020      No results found for: \"MICROALBUMIN\"   Lab Results   Component Value Date    CRP 0.4 12/17/2020      Lab Results   Component Value Date    CHOL 151 11/09/2023    CHOL 228 (H) 12/17/2020      Lab Results   Component Value Date    TRIG 113 11/09/2023    TRIG 131 12/17/2020      Lab Results   Component Value Date    HDL 65 11/09/2023    HDL 79 12/17/2020      Lab Results   Component Value Date    LDL 63 11/09/2023     (H) 12/17/2020      No results found for: \"VLDL\"   No results found for: \"CHOLHDLRATIO\"  Lab Results   Component Value Date    NHDL 86 11/09/2023    NHDL 149 (H) 12/17/2020        Assessment:    Cardiovascular:  Asymptomatic, he is not complaining of chest pain, EKG revealed NR, incomplete RBBB, HR 66 bpm, same as EKG in 2020, no plaque detected in carotid arteries, CAC score 26.9, 45th percentile 2021, arterial elasticity is borderline    Blood Pressure:  He takes Lotrel 2.5/10 mg 2 tablets in am, -132/76 mmHg, 12/29/22   BUN/Cr 16/.87    Lipids:  He takes 40 mg of atorvastatin, check Lp(a), result <6   Latest Ref Rng 12/17/2020  8:37 AM 12/17/2020  9:53 AM 12/17/2020  9:58 AM 11/9/2023  8:46 AM   BP WT " CHOL        Cholesterol <200 mg/dL 228 (H)    151    HDL Cholesterol >=40 mg/dL 79    65    LDL Cholesterol Calculated <=100 mg/dL 123 (H)    63    Triglycerides <150 mg/dL 131    113       Glucose: 90    Sleep pattern:  Sleep hygiene reviewed during clinic visit, handout given to patient    Weight Management: BMI 26.33    Exercise:  Encourage him to add CV exercise to routine 150 minutes/week    Return to Clinic: 3-5 years    Health Habit Summary:  Nutrition: Heart Healthy Eating:  most of the time   Exercise:  regularly active  Weight:  normal weight range  Tobacco Use:  never used    This case was presented to Dr. Bowens and Dr. Eduardo Zhang during our weekly conference.     60 minutes spent on the date of the encounter doing (chart review/review of outside records/review of test results/interpretation of tests/patient visit/documentation/discussion with other provider(s)   JOHN Goode CNP       CC  Patient Care Team:  Matias Orantes as PCP - General (Internal Medicine)  Jeaneth Campuzano APRN CNP as Nurse Practitioner (Cardiovascular Disease)  SELF, REFERRED      Sanderson Test Results    WALKING BLOOD PRESSURE RESPONSE (3 minute, 5 MET level walk)   Pre BP: 120/64 mmHg  3 min BP: 142/56 mmHg  1 min post BP: 110/72 mmHg    Pre HR: 70 bpm  3 min HR: 110 bpm  1 min post HR: 70 bpm     Test results: Walking blood pressure response to 3 minutes activity is in normal range.     RETINAL VASCULAR ASSESSMENT   Left Eye Abnormality:  none  AV Ratio: 0.8    Right Eye Abnormality:  none  AV Ratio: 0.8     Retinal Assessment:  normal    ABDOMINAL AORTA ULTRASOUND (< 2.5 normal, borderline 2.5-2.9, abnormal > 3)   SupraIliac 1.90 cm    SupraRenal 1.77 cm    InfraRenal Proximal 1.8 cm    InfraRenal Distal 1.90 cm      Abdominal Aorta Assessment:  normal    LEFT VENTRICULAR ULTRASOUND MEASUREMENTS (adjusted for BSA)  LVIDD 48.2 mm   Septa 9.0 mm   Posterior 9.8 mm     Left Ventricular US Assessment:  normal    Carotid  Artery IMT measurements report and plaques in the small area examined:   Left IMT 0.712 mm  Plaques none    Right IMT 0.551 mm  Plaques none     Test results: Carotid arteries wall thickening is in normal range with no plaque formations present.     ECG (see tracing):  normal sinus rhythm;  rate: 60 bpm    Arterial Elasticity per age and gender (see printout):   C1 10.4 mL/mmHg x 10  borderline   C2 5.2 mL/mmHg x 100 normal   Supine blood pressure: 138/78 mmHg     Test results: Arterial elasticity of the large size arteries is in borderline range after adjusting for age and gender. Arterial elasticity of the small size arteries is in normal range after adjusting for age and gender.       Sanderson disease score: 2    Yadira Wong      Please do not hesitate to contact me if you have any questions/concerns.     Sincerely,     JOHN Goode CNP

## 2023-11-09 NOTE — PROGRESS NOTES
[facility]ovascular Disease Prevention - Exam Note    Active Problems   Patient Active Problem List    Diagnosis Date Noted    DDD (degenerative disc disease), lumbar      Priority: Medium    Lumbar herniated disc      Priority: Medium     L4-5      Hyperlipidemia LDL goal <160      Priority: Medium    History of colonic polyps      Priority: Medium       Reason For Visit   Patient here for [facility] early detection of atherosclerosis and CVD exam.    Pain Evaluation  Current history of pain associated with this visit is: denied    Cardiac risk factors:  - age    - smoking     - elevated BMI      + Family history CVD       - Diet           + Hypertension    HPI   Darius Julian is a 58 year old year old male with a history of hypertension, hyperlipidemia and hypothyroidism. He takes 5/20 mg of lotrel and 40 mg of atorvastatin.  His most recent CAC score in 2021 was 26.9, 45 percentile. His family history of heart disease includes his mother having history of hypertension and TIAs, his brother had a MI at age 51 s/p PCI/LESTER and his maternal grandmother had a MI at age 60. He was seen in the [facility] CV clinic in 12./2020, score 6.  His primary care provider was Dr. Orantes.  He continues to look for a new PCP. He worked at [employer] and left last April. He was in corporate strategy leader.  Today in clinic he denies chest pain at rest, with activity, while sleeping, SOB at rest, with activity or while sleeping, palpitations, lightheadedness, lower leg edema, calf cramps, indigestion, headaches or issues with his memory. He had an episode in September 2023 where BP went up to 180/90s mmHg.  His Lotrel dose was doubled at that time.     Nutrition assessment per patient report:   Foods with fat/cholesterol (fried foods, fatty meats, junk food):   tinajero 1x/1-2 months, salomi 2x/month     Fruits and vegetables (  cup cooked, 1 cup raw):  1-3 sevings of vegetables/day, 1-3 servings of  fruit/day  Caffeine (1 cup coffee, soda, etc):   3-4 cups coffee/day  Alcohol servings (12 oz. beer, 4 oz. wine, 1  oz. in mixed drink):   10 drinks/week, wine with dinner  Special dietary habits:  healthy diet  Typical breakfast: toast, bagel, granola bar                 Lunch: sandwich, meat and cheese                 Dinner: protein, vegetable, salad                 Snacks: no                 Drinks:  water  Activity  Patient is active 5-10,000 steps/day    Sleep pattern: good    Laboratory Results Review  We discussed laboratory results today including lipids targets and how foods influence cholesterol.    Weight  His perceived healthy weight is 170-180 pounds.  A normal BMI of 25 is equal to 166 pounds.  The current BMI of 26.33 is normal weight range.      PMH   Past Medical History:   Diagnosis Date    Benign essential hypertension     History of colonic polyps     Hyperlipidemia LDL goal <160     Hypothyroidism     Lumbar herniated disc     L4-5    Thyroid disease        PSH  Past Surgical History:   Procedure Laterality Date    HC TOOTH EXTRACTION W/FORCEP         Current Meds   Current Outpatient Medications   Medication Sig Dispense Refill    levothyroxine (SYNTHROID/LEVOTHROID) 88 MCG tablet Take 1 tablet by mouth daily      omeprazole (PRILOSEC) 40 MG DR capsule PRN. Updated on 12/29/22      amLODIPine-benazepril (LOTREL) 2.5-10 MG capsule Take 1 capsule by mouth daily 90 capsule 3    atorvastatin (LIPITOR) 40 MG tablet Take 1 tablet (40 mg) by mouth daily Take 1/2 pill X 7 days and then advance to 1 pill every day 90 tablet 0       Allergies      Allergies   Allergen Reactions    Lactose        Family Hx   Family History   Problem Relation Age of Onset    Hypertension Mother     Cerebrovascular Disease Mother         s/p TIA    Colon Cancer Father         cause of death    Osteogenesis imperfecta Sister     Coronary Artery Disease Brother 51        s/p MI age 51, PCI/stent 2013 1 stent    Hypertension  "Brother     Hyperlipidemia Brother     Coronary Artery Disease Maternal Grandmother         s/p MI age 60, cause of death    Attention Deficit Disorder Son     Attention Deficit Disorder Son        Social History    He is  with 3 children    Tobacco History  History   Smoking Status    Never   Smokeless Tobacco    Never       ROS  CONSTITUTIONAL:  No fever, chills, or sweats. No weight gain/loss.   EENT:  No visual disturbance, ear ache, epistaxis, sore throat  ALLERGIES/IMMUNOLOGIC:  Negative  RESPIRATORY:  No cough, hemoptysis  CARDIOVASCULAR:  As per HPI  GI:  No nausea, vomiting, hematemesis, melena  :  No urinary frequency, dysuria, or hematuria  INTEGUMENT:  Negative  PSYCHIATRIC:  Negative  NEURO:  Negative  ENDOCRINE:  Negative  MUSCULOSKELETAL:  Negative     Vital Signs   /76 (Cuff Size: Adult Regular)   Pulse 72   Ht 1.74 m (5' 8.5\")   Wt 79.7 kg (175 lb 11.2 oz)   SpO2 98%   BMI 26.33 kg/m        Waist: 37.5 inches  Hip: 39.5 inches    Physical Exam   In general, the patient is a pleasant male in no apparent distress   HEENT: NC/AT, PERRLA, EOMI, sclerae white, not injected. Nares clear, pharynx without erythema or exudate, dentition intact    Neck: No adenopathy, no thyromegaly, carotids +4/4 bilaterally without bruits,  no jugular venous distension   Lungs: Breath sounds clear bilaterally, without crackles, ronchi, or wheezes  Cor: RRR, S1S2 without murmur, rub, click, or gallop, the PMI is in the 5th ICS in the midclavicular line  Abdomen: Soft, nontender, nondistended, BS+ in all 4 quadrants, without hepatomegaly, no aorta or renal artery bruits  Extremities: No clubbing, cyanosis, or edema. DP and PT pulses +2/4 bilaterally    The 10-year ASCVD risk score (Oakleycris SEPULVEDA Jr., et al., 2013) is: ENGLISH score 7.1%  Values used to calculate the score:   Age: 58 year old   Sex: male   Is Non- : No   Diabetic: No   Tobacco smoker: No   Systolic Blood Press: 132 mmHg   Is " "BP treated: No   HDL Cholesterol: 65 mg/dL   Total Cholesterol: 151 mg/dL    Recent Labs  Lab Results   Component Value Date    GLC 90 11/09/2023    GLC 98 12/17/2020      Lab Results   Component Value Date    NTBNP 34 12/17/2020     No results found for: \"NTBNPI\"   Lab Results   Component Value Date    UCRR 280 12/17/2020      Lab Results   Component Value Date    MICROL 22 12/17/2020      No results found for: \"MICROALBUMIN\"   Lab Results   Component Value Date    CRP 0.4 12/17/2020      Lab Results   Component Value Date    CHOL 151 11/09/2023    CHOL 228 (H) 12/17/2020      Lab Results   Component Value Date    TRIG 113 11/09/2023    TRIG 131 12/17/2020      Lab Results   Component Value Date    HDL 65 11/09/2023    HDL 79 12/17/2020      Lab Results   Component Value Date    LDL 63 11/09/2023     (H) 12/17/2020      No results found for: \"VLDL\"   No results found for: \"CHOLHDLRATIO\"  Lab Results   Component Value Date    NHDL 86 11/09/2023    NHDL 149 (H) 12/17/2020        Assessment:    Cardiovascular:  Asymptomatic, he is not complaining of chest pain, EKG revealed NR, incomplete RBBB, HR 66 bpm, same as EKG in 2020, no plaque detected in carotid arteries, CAC score 26.9, 45th percentile 2021, arterial elasticity is borderline    Blood Pressure:  He takes Lotrel 2.5/10 mg 2 tablets in am, -132/76 mmHg, 12/29/22   BUN/Cr 16/.87    Lipids:  He takes 40 mg of atorvastatin, check Lp(a), result <6   Latest Ref Rng 12/17/2020  8:37 AM 12/17/2020  9:53 AM 12/17/2020  9:58 AM 11/9/2023  8:46 AM   BP WT CHOL        Cholesterol <200 mg/dL 228 (H)    151    HDL Cholesterol >=40 mg/dL 79    65    LDL Cholesterol Calculated <=100 mg/dL 123 (H)    63    Triglycerides <150 mg/dL 131    113       Glucose: 90    Sleep pattern:  Sleep hygiene reviewed during clinic visit, handout given to patient    Weight Management: BMI 26.33    Exercise:  Encourage him to add CV exercise to routine 150 minutes/week    Return to " Clinic: 3-5 years    Health Habit Summary:  Nutrition: Heart Healthy Eating:  most of the time   Exercise:  regularly active  Weight:  normal weight range  Tobacco Use:  never used    This case was presented to Dr. Bowens and Dr. Eduardo Zhang during our weekly conference.     60 minutes spent on the date of the encounter doing (chart review/review of outside records/review of test results/interpretation of tests/patient visit/documentation/discussion with other provider(s)   JOHN Goode CNP       CC  Patient Care Team:  Matias Orantes as PCP - General (Internal Medicine)  Jeaneth Campuzano APRN CNP as Nurse Practitioner (Cardiovascular Disease)  SELF, REFERRED

## 2023-11-09 NOTE — PROGRESS NOTES
Sanderson Test Results    WALKING BLOOD PRESSURE RESPONSE (3 minute, 5 MET level walk)   Pre BP: 120/64 mmHg  3 min BP: 142/56 mmHg  1 min post BP: 110/72 mmHg    Pre HR: 70 bpm  3 min HR: 110 bpm  1 min post HR: 70 bpm     Test results: Walking blood pressure response to 3 minutes activity is in normal range.     RETINAL VASCULAR ASSESSMENT   Left Eye Abnormality:  none  AV Ratio: 0.8    Right Eye Abnormality:  none  AV Ratio: 0.8     Retinal Assessment:  normal    ABDOMINAL AORTA ULTRASOUND (< 2.5 normal, borderline 2.5-2.9, abnormal > 3)   SupraIliac 1.90 cm    SupraRenal 1.77 cm    InfraRenal Proximal 1.8 cm    InfraRenal Distal 1.90 cm      Abdominal Aorta Assessment:  normal    LEFT VENTRICULAR ULTRASOUND MEASUREMENTS (adjusted for BSA)  LVIDD 48.2 mm   Septa 9.0 mm   Posterior 9.8 mm     Left Ventricular US Assessment:  normal    Carotid Artery IMT measurements report and plaques in the small area examined:   Left IMT 0.712 mm  Plaques none    Right IMT 0.551 mm  Plaques none     Test results: Carotid arteries wall thickening is in normal range with no plaque formations present.     ECG (see tracing):  normal sinus rhythm;  rate: 60 bpm    Arterial Elasticity per age and gender (see printout):   C1 10.4 mL/mmHg x 10  borderline   C2 5.2 mL/mmHg x 100 normal   Supine blood pressure: 138/78 mmHg     Test results: Arterial elasticity of the large size arteries is in borderline range after adjusting for age and gender. Arterial elasticity of the small size arteries is in normal range after adjusting for age and gender.       Sanderson disease score: 2    Yadira Wong

## 2023-11-10 LAB
ATRIAL RATE - MUSE: 66 BPM
DIASTOLIC BLOOD PRESSURE - MUSE: NORMAL MMHG
INTERPRETATION ECG - MUSE: NORMAL
P AXIS - MUSE: 64 DEGREES
PR INTERVAL - MUSE: 150 MS
QRS DURATION - MUSE: 106 MS
QT - MUSE: 406 MS
QTC - MUSE: 425 MS
R AXIS - MUSE: 27 DEGREES
SYSTOLIC BLOOD PRESSURE - MUSE: NORMAL MMHG
T AXIS - MUSE: 32 DEGREES
VENTRICULAR RATE- MUSE: 66 BPM

## 2023-11-16 NOTE — PATIENT INSTRUCTIONS
Screening Results Summary Report     [facility] for Cardiovascular Disease Prevention    Thank you for choosing to participate in the prevention screening offered at the [facility]. Prevention screening is important part of health care.  Atherosclerosis may result in heart attacks, strokes, heart failure, peripheral artery disease and shortened life expectancy. The risk for premature development of this disease is both genetic (family history) and environmental (diet, exercise, lifestyle, etc.).  Goals of your cardiovascular prevention screening include detecting the earliest signs of blood vessel or heart abnormalities, and identifying markers for risk that can be treated if identified early. Recommendations are included to improve health habits. In some cases medication may be recommended to help slow progression of disease. Our goal is to assist you in prevention of a heart attack, stroke and other cardiovascular diseases that are the major cause of illness and mortality in our society.    Your total cholesterol and LDL (bad cholesterol) results are in the  optimal  range. Your other cholesterol numbers are also at goal.  We recommend continuation of ongoing health habit modification (heart healthy nutrition, maintaining your weight within a normal weight range and an exercise routine) to maintain these levels.  An ideal weight range is a body mass index of 25 or less. Continue to take 40 mg of atorvastatin. Your lipoprotein (a) level was < 6, normal range is ,29.    2. Your C-reactive protein is elevated.  An elevated CRP is an indicator of inflammation.  Inflammation may affect the condition of the blood vessel wall and contribute to the development of arteriosclerosis (fatty deposits with blood vessel thickening).  Conditions such as infections, colds, flu and inflammatory conditions can cause CRP to elevate for a period of time and return to normal.  The specific cause of your elevated CRP is  unknown.  No specific treatment to lower CRP is available, but risk factor reduction is important.      3. Your arterial elasticity (artery stiffness) is borderline and may be a normal variation or an early indication of the development of vascular (blood vessel) disease and high blood pressure. Statin cholesterol medications, some blood pressure medications and healthy living habits particularly exercise are helpful to preserve artery elasticity. Continue to monitor your blood pressure one/week, record results and take those results to your next primary care clinic visit.  Omron is good home blood pressure monitor brand to purchase.  This blood pressure cuff machine can be purchased at [employer] or [employer].     4. Your diet is heart healthy and well balanced.  We recommend increasing your intake of vegetables to 4-5 servings/day and increasing your fruit intake to 3-4 servings/day and incorporating healthy fats of the the Mediterranean diet into your diet. Eating salmon and using extra virgin olive oil are good examples. Nutrients found in fruits, vegetables and whole grains have been shown to be beneficial for the long-term health of your heart and blood vessels.     5.  The American Heart Association recommends 150 minutes of exercise per week, including strength (resistance) training.  Regular exercise can help maintain or lower cholesterol, blood pressure, blood glucose and improve the health of your heart and blood vessels. We recommend increasing your exercise routine to 5 days per week and adding a cardiovascular component to your exercise routine.  Always exercise within your comfort zone (no chest pain, able to talk comfortably).     6. We suggest that you consider incorporating 4-7-8 relaxation breathing, mindfulness stress reduction, meditation, yoga,and/or aromatherapy into your healthy lifestyle routine. All of these integrative therapies have been shown to be useful in reducing stress  and promoting health. The website for the [facility] at the [facility] is [email or web address]. Taking Charge of Your Health and Wellbeing is a wonderful assessment tool to learn more about your wellbeing.    7.  Return to clinic in 3-5 years.     Thank you for choosing to participate in the prevention screening at [facility] CV Prevention clinic.  Cardiovascular prevention screening is important. Atherosclerosis may result in heart attacks, strokes, heart failure, peripheral artery disease.    Jeaneth Campuzano, DNP, APRN, FNP-C

## 2024-02-03 ENCOUNTER — HEALTH MAINTENANCE LETTER (OUTPATIENT)
Age: 59
End: 2024-02-03

## 2024-03-13 ENCOUNTER — APPOINTMENT (OUTPATIENT)
Dept: URBAN - METROPOLITAN AREA CLINIC 256 | Age: 59
Setting detail: DERMATOLOGY
End: 2024-03-14

## 2024-03-13 VITALS — HEIGHT: 70 IN | WEIGHT: 180 LBS

## 2024-03-13 DIAGNOSIS — D22 MELANOCYTIC NEVI: ICD-10-CM

## 2024-03-13 DIAGNOSIS — D18.0 HEMANGIOMA: ICD-10-CM

## 2024-03-13 DIAGNOSIS — L82.0 INFLAMED SEBORRHEIC KERATOSIS: ICD-10-CM

## 2024-03-13 DIAGNOSIS — L57.8 OTHER SKIN CHANGES DUE TO CHRONIC EXPOSURE TO NONIONIZING RADIATION: ICD-10-CM

## 2024-03-13 DIAGNOSIS — Z71.89 OTHER SPECIFIED COUNSELING: ICD-10-CM

## 2024-03-13 DIAGNOSIS — B07.8 OTHER VIRAL WARTS: ICD-10-CM

## 2024-03-13 DIAGNOSIS — L82.1 OTHER SEBORRHEIC KERATOSIS: ICD-10-CM

## 2024-03-13 DIAGNOSIS — L57.3 POIKILODERMA OF CIVATTE: ICD-10-CM

## 2024-03-13 DIAGNOSIS — L57.0 ACTINIC KERATOSIS: ICD-10-CM

## 2024-03-13 PROBLEM — D22.5 MELANOCYTIC NEVI OF TRUNK: Status: ACTIVE | Noted: 2024-03-13

## 2024-03-13 PROBLEM — D22.71 MELANOCYTIC NEVI OF RIGHT LOWER LIMB, INCLUDING HIP: Status: ACTIVE | Noted: 2024-03-13

## 2024-03-13 PROBLEM — D22.62 MELANOCYTIC NEVI OF LEFT UPPER LIMB, INCLUDING SHOULDER: Status: ACTIVE | Noted: 2024-03-13

## 2024-03-13 PROBLEM — D22.39 MELANOCYTIC NEVI OF OTHER PARTS OF FACE: Status: ACTIVE | Noted: 2024-03-13

## 2024-03-13 PROBLEM — D18.01 HEMANGIOMA OF SKIN AND SUBCUTANEOUS TISSUE: Status: ACTIVE | Noted: 2024-03-13

## 2024-03-13 PROBLEM — D22.72 MELANOCYTIC NEVI OF LEFT LOWER LIMB, INCLUDING HIP: Status: ACTIVE | Noted: 2024-03-13

## 2024-03-13 PROBLEM — D22.61 MELANOCYTIC NEVI OF RIGHT UPPER LIMB, INCLUDING SHOULDER: Status: ACTIVE | Noted: 2024-03-13

## 2024-03-13 PROCEDURE — OTHER PHOTO-DOCUMENTATION: OTHER

## 2024-03-13 PROCEDURE — OTHER LIQUID NITROGEN: OTHER

## 2024-03-13 PROCEDURE — OTHER MIPS QUALITY: OTHER

## 2024-03-13 PROCEDURE — OTHER EDUCATIONAL RESOURCES PROVIDED: OTHER

## 2024-03-13 PROCEDURE — 17000 DESTRUCT PREMALG LESION: CPT | Mod: 59

## 2024-03-13 PROCEDURE — 99213 OFFICE O/P EST LOW 20 MIN: CPT | Mod: 25

## 2024-03-13 PROCEDURE — OTHER COUNSELING: OTHER

## 2024-03-13 PROCEDURE — 17110 DESTRUCT B9 LESION 1-14: CPT

## 2024-03-13 ASSESSMENT — LOCATION DETAILED DESCRIPTION DERM
LOCATION DETAILED: LEFT VENTRAL PROXIMAL FOREARM
LOCATION DETAILED: RIGHT DISTAL POSTERIOR UPPER ARM
LOCATION DETAILED: LEFT INFERIOR CENTRAL MALAR CHEEK
LOCATION DETAILED: RIGHT VENTRAL DISTAL FOREARM
LOCATION DETAILED: LEFT SUPERIOR ANTERIOR NECK
LOCATION DETAILED: LEFT INFERIOR PARIETAL SCALP
LOCATION DETAILED: LEFT LATERAL ABDOMEN
LOCATION DETAILED: LEFT INFERIOR MEDIAL MIDBACK
LOCATION DETAILED: LEFT CENTRAL MALAR CHEEK
LOCATION DETAILED: LEFT SUPERIOR PARIETAL SCALP
LOCATION DETAILED: RIGHT SUPERIOR PARIETAL SCALP
LOCATION DETAILED: LEFT DISTAL POSTERIOR THIGH
LOCATION DETAILED: LEFT ANTERIOR PROXIMAL THIGH
LOCATION DETAILED: RIGHT DISTAL POSTERIOR THIGH
LOCATION DETAILED: LEFT DISTAL POSTERIOR UPPER ARM
LOCATION DETAILED: RIGHT ANTERIOR DISTAL THIGH

## 2024-03-13 ASSESSMENT — LOCATION SIMPLE DESCRIPTION DERM
LOCATION SIMPLE: RIGHT THIGH
LOCATION SIMPLE: RIGHT UPPER ARM
LOCATION SIMPLE: LEFT FOREARM
LOCATION SIMPLE: LEFT LOWER BACK
LOCATION SIMPLE: LEFT ANTERIOR NECK
LOCATION SIMPLE: SCALP
LOCATION SIMPLE: LEFT UPPER ARM
LOCATION SIMPLE: RIGHT POSTERIOR THIGH
LOCATION SIMPLE: LEFT THIGH
LOCATION SIMPLE: LEFT CHEEK
LOCATION SIMPLE: ABDOMEN
LOCATION SIMPLE: LEFT POSTERIOR THIGH
LOCATION SIMPLE: RIGHT FOREARM

## 2024-03-13 ASSESSMENT — LOCATION ZONE DERM
LOCATION ZONE: NECK
LOCATION ZONE: FACE
LOCATION ZONE: LEG
LOCATION ZONE: TRUNK
LOCATION ZONE: ARM
LOCATION ZONE: SCALP

## 2024-03-13 NOTE — HPI: FULL BODY SKIN EXAMINATION
What Is The Reason For Today's Visit?: Full Body Skin Examination
What Is The Reason For Today's Visit? (Being Monitored For X): concerning skin lesions on an annual basis
Additional History: Warts\\nScalp 3 months hit when shaved

## 2024-03-13 NOTE — PROCEDURE: LIQUID NITROGEN
Post-Care Instructions: I reviewed with the patient in detail post-care instructions. Patient is to wear sunprotection, and avoid picking at any of the treated lesions. Pt may apply Vaseline to crusted or scabbing areas.
Detail Level: Detailed
Show Aperture Variable?: Yes
Duration Of Freeze Thaw-Cycle (Seconds): 5-10
Render Post-Care Instructions In Note?: no
Medical Necessity Information: It is in your best interest to select a reason for this procedure from the list below. All of these items fulfill various CMS LCD requirements except the new and changing color options.
Consent: The patient's verbal consent was obtained including but not limited to risks of crusting, scabbing, blistering, scarring, darker or lighter pigmentary change, recurrence, incomplete removal and infection.
Medical Necessity Clause: This procedure was medically necessary because the lesions that were treated were:
Number Of Freeze-Thaw Cycles: 1 freeze-thaw cycle
Spray Paint Text: The liquid nitrogen was applied to the skin utilizing a spray paint frosting technique.
Duration Of Freeze Thaw-Cycle (Seconds): 3
Detail Level: Simple

## 2024-07-10 DIAGNOSIS — I10 BENIGN ESSENTIAL HYPERTENSION: ICD-10-CM

## 2024-07-10 DIAGNOSIS — E78.5 HYPERLIPIDEMIA LDL GOAL <160: ICD-10-CM

## 2024-07-11 RX ORDER — AMLODIPINE AND BENAZEPRIL HYDROCHLORIDE 5; 20 MG/1; MG/1
1 CAPSULE ORAL DAILY
Qty: 90 CAPSULE | Refills: 3 | Status: SHIPPED | OUTPATIENT
Start: 2024-07-11

## 2025-03-02 ENCOUNTER — HEALTH MAINTENANCE LETTER (OUTPATIENT)
Age: 60
End: 2025-03-02